# Patient Record
Sex: MALE
[De-identification: names, ages, dates, MRNs, and addresses within clinical notes are randomized per-mention and may not be internally consistent; named-entity substitution may affect disease eponyms.]

---

## 2019-07-22 PROBLEM — Z00.00 ENCOUNTER FOR PREVENTIVE HEALTH EXAMINATION: Status: ACTIVE | Noted: 2019-07-22

## 2019-08-07 ENCOUNTER — APPOINTMENT (OUTPATIENT)
Dept: COLORECTAL SURGERY | Facility: CLINIC | Age: 79
End: 2019-08-07
Payer: MEDICARE

## 2019-08-07 VITALS
TEMPERATURE: 97.9 F | HEIGHT: 65.75 IN | SYSTOLIC BLOOD PRESSURE: 151 MMHG | BODY MASS INDEX: 25.21 KG/M2 | DIASTOLIC BLOOD PRESSURE: 85 MMHG | WEIGHT: 155 LBS | HEART RATE: 80 BPM

## 2019-08-07 DIAGNOSIS — L29.0 PRURITUS ANI: ICD-10-CM

## 2019-08-07 PROCEDURE — 99202 OFFICE O/P NEW SF 15 MIN: CPT

## 2019-08-07 RX ORDER — LIDOCAINE 2.5% AND PRILOCAINE 2.5% 25; 25 MG/G; MG/G
2.5-2.5 CREAM TOPICAL
Refills: 0 | Status: ACTIVE | COMMUNITY

## 2019-08-07 RX ORDER — KETOCONAZOLE 20 MG/G
2 CREAM TOPICAL
Refills: 0 | Status: ACTIVE | COMMUNITY

## 2019-08-07 RX ORDER — CICLOPIROX OLAMINE 7.7 MG/G
0.77 CREAM TOPICAL
Refills: 0 | Status: ACTIVE | COMMUNITY

## 2019-08-07 RX ORDER — CLOTRIMAZOLE AND BETAMETHASONE DIPROPIONATE 10; .5 MG/G; MG/G
1-0.05 CREAM TOPICAL
Refills: 0 | Status: ACTIVE | COMMUNITY

## 2019-08-07 RX ORDER — DICLOFENAC SODIUM, ISOPROPYL ALCOHOL 1 %
1 KIT TOPICAL
Refills: 0 | Status: ACTIVE | COMMUNITY

## 2019-08-12 NOTE — PHYSICAL EXAM
[Excoriation] : excoriations [de-identified] : moderate preanal erythema- circumferential. multiple areas 2 3 cm with superficial ulceration.draining seropurulent exudate.

## 2019-08-12 NOTE — HISTORY OF PRESENT ILLNESS
[FreeTextEntry1] : 77 y/o M presents for evaluation of diarrhea for the past 6 months and ulcerations\par GI prescribed patient course of Xifaxan with no improvement in symptoms\par C/o severe pain daily, using Diclofenc 1% ointment, ketoconazole 2%, ciclopriox ointment and clotriamazole-betamethasone ointment daily with minimal improvement in symptoms\par C/o watery diarrhea several times daily\par Patient has deferred colonoscopy due to pain

## 2019-08-12 NOTE — ASSESSMENT
[FreeTextEntry1] : I discussed with the patient that his diarrhea has likely contributed to his perianal dermatitis.\par \par Advised antidiarrheal agents.\par \par Advised stopping steroid and antifungal creams.  He will start a barrier protective ointment .  Advised return in 2 weeks for biopsy and culture if symptoms are persistent.\par

## 2019-08-12 NOTE — CONSULT LETTER
[Dear  ___] : Dear  [unfilled], [Consult Letter:] : I had the pleasure of evaluating your patient, [unfilled]. [( Thank you for referring [unfilled] for consultation for _____ )] : Thank you for referring [unfilled] for consultation for [unfilled] [Please see my note below.] : Please see my note below. [Consult Closing:] : Thank you very much for allowing me to participate in the care of this patient.  If you have any questions, please do not hesitate to contact me. [Sincerely,] : Sincerely, [FreeTextEntry2] : Dr.Jian Sen Coronel\par [id number] \[place] [id number] [FreeTextEntry3] : Edin Newman MD